# Patient Record
Sex: MALE | Race: WHITE
[De-identification: names, ages, dates, MRNs, and addresses within clinical notes are randomized per-mention and may not be internally consistent; named-entity substitution may affect disease eponyms.]

---

## 2018-06-26 ENCOUNTER — HOSPITAL ENCOUNTER (EMERGENCY)
Dept: HOSPITAL 58 - ED | Age: 53
Discharge: HOME | End: 2018-06-26

## 2018-06-26 VITALS — BODY MASS INDEX: 29.5 KG/M2

## 2018-06-26 VITALS — SYSTOLIC BLOOD PRESSURE: 154 MMHG | TEMPERATURE: 98.3 F | DIASTOLIC BLOOD PRESSURE: 117 MMHG

## 2018-06-26 DIAGNOSIS — R10.9: Primary | ICD-10-CM

## 2018-06-26 DIAGNOSIS — Z79.899: ICD-10-CM

## 2018-06-26 DIAGNOSIS — R93.2: ICD-10-CM

## 2018-06-26 PROCEDURE — 93005 ELECTROCARDIOGRAM TRACING: CPT

## 2018-06-26 PROCEDURE — 82550 ASSAY OF CK (CPK): CPT

## 2018-06-26 PROCEDURE — 84484 ASSAY OF TROPONIN QUANT: CPT

## 2018-06-26 PROCEDURE — 36415 COLL VENOUS BLD VENIPUNCTURE: CPT

## 2018-06-26 PROCEDURE — 96372 THER/PROPH/DIAG INJ SC/IM: CPT

## 2018-06-26 PROCEDURE — 83690 ASSAY OF LIPASE: CPT

## 2018-06-26 PROCEDURE — 82150 ASSAY OF AMYLASE: CPT

## 2018-06-26 PROCEDURE — 85025 COMPLETE CBC W/AUTO DIFF WBC: CPT

## 2018-06-26 PROCEDURE — 80053 COMPREHEN METABOLIC PANEL: CPT

## 2018-06-26 PROCEDURE — 81001 URINALYSIS AUTO W/SCOPE: CPT

## 2018-06-26 PROCEDURE — 99283 EMERGENCY DEPT VISIT LOW MDM: CPT

## 2018-06-26 PROCEDURE — 93010 ELECTROCARDIOGRAM REPORT: CPT

## 2018-06-26 NOTE — ED.PDOC
General


ED Provider: 


Dr. MATIAS CRUZ-ER





Chief Complaint: Abdominal Pain


Stated Complaint: im hurting


Time Seen by Physician: 19:40


Mode of Arrival: Walk-In


Information Source: Patient, Family


Exam Limitations: No limitations


Nursing and Triage Documentation Reviewed and Agree: Yes


Does patient meet sepsis criteria?: No


System Inflammatory Response Syndrome: Not Applicable


Sepsis Protocol: 


For patient's 13 years and over:





Temp is 96.8 and below  and greater


Pulse >90 BPM


Resp >20/minute


Acutely Altered Mental Status





Are patient's symptoms suggestive of a new infection, such as:


   -Pneumonia


   -Skin, Soft Tissue


   -Endocarditis


   -UTI


   -Bone, Joint Infection


   -Implantable Device


   -Acute Abdominal Infection


   -Wound Infection


   -Meningitis


   -Blood Stream Catheter Infection


   -Unknown








GI Complaint Exam





- Abdominal Pain Complaint/Exam


Onset: Gradual


Duration: several hours


Symptoms Are: Still present


Timing: Intermittent


Initial Severity: Mild


Current Severity: Moderate


Location of Pain: Diffuse


Radiates To: Reports: Back


Character: Reports: Dull, Aching, Cramping


Alleviating: Reports: Spontaneous resolution


Associated Signs and Symptoms: Denies: Diaphoresis, Fever, Cough, Chest pain, 

Dizziness, Back pain, Constipation, Blood in stool, Dysuria, Urinary frequency, 

Decreased urine output, Decreased appetite, Discharge, Nausea, Vomiting, 

Diarrhea, Decreased activity


Quality Indicator For Non-Traumatic Chest Pain/Syncope: EKG Performed





Review of Systems





- Review Of Systems


Constitutional: Reports: No symptoms


Eyes: Reports: No symptoms


Ears, Nose, Mouth, Throat: Reports: No symptoms


Respiratory: Reports: No symptoms


Cardiac: Reports: No symptoms


GI: Reports: Abdomen distended


: Reports: No symptoms


Musculoskeletal: Reports: No symptoms


Skin: Reports: No symptoms


Neurological: Reports: No symptoms


Endocrine: Reports: No symptoms


Hematologic/Lymphatic: Reports: No symptoms


All Other Systems: Reviewed and Negative





Past Medical History





- Past Medical History


Previously Healthy: No


Endocrine: Reports: Unknown


Cardiovascular: Reports: Unknown


Respiratory: Reports: Unknown


Hematological: Reports: Unknown


Gastrointestinal: Reports: Crohn's


Genitourinary: Reports: Unknown


Neuro/Psych: Reports: Unknown


Musculoskeletal: Reports: Unknown


Cancer: Reports: Unknown





- Surgical History


General Surgical History: Reports: Unknown





- Family History


Family History: Reports: Unknown





- Social History


Smoking Status: Never smoker


Hx Substance Use: No


Alcohol Screening: None





Physical Exam





- Physical Exam


Appearance: Well-appearing, No pain distress, Well-nourished


Pain Distress: Mild


Eyes: ELISA, EOMI, Conjunctiva clear


ENT: Ears normal, Nose normal, Oropharynx normal


Neck: Supple


Respiratory: Airway patent, Breath sounds clear, Breath sounds equal, 

Respirations nonlabored


Cardiovascular: RRR, Pulses normal, No rub, No murmur


GI/: Soft, Nontender, No masses, Bowel sounds normal, No Organomegaly


Musculoskeletal: Normal strength


Skin: Warm, Dry, Normal color


Neurological: Sensation intact


Psychiatric: Affect appropriate, Mood appropriate





Interpretation





- Radiology Interpretation


Radiology Interpretation By: Radiologist


Radiology Results: Positive


Exam Interpreted: CT Scan





- EKG Interpretation


Time of EKG #1: 21:10


Rate: Normal


Rhythm: Sinus


Ectopy: None


Axis: NL


ST Segment: Normal





Re-Evaluation





- Re-Evaluation


Time of Re-Evaluation: 21:10


Status: Improved


Vital Signs Stable: Yes


Pain Level: 1


Appearance: NAD


Lungs: Clear


Skin: Warm and Dry


Neuro: Alert and Oriented X3


CV: RRR





Critical Care Note





- Critical Care Note


Total Time (mins): 0





Course





- Course


Hematology/Chemistry: 


 06/26/18 20:05





 06/26/18 20:05


Orders, Labs, Meds: 





Lab Review











  06/26/18 06/26/18 06/26/18





  20:05 20:05 20:24


 


WBC  8.62  


 


RBC  4.72  


 


Hgb  14.4  


 


Hct  41.3 L  


 


MCV  87.5  


 


MCH  30.5  


 


MCHC  34.9  


 


RDW Coeff of Susie  12.5  


 


Plt Count  182  


 


Immature Gran % (Auto)  0.3  


 


Neut % (Auto)  77.2  


 


Lymph % (Auto)  16.8  


 


Mono % (Auto)  4.5  


 


Eos % (Auto)  0.7  


 


Baso % (Auto)  0.5  


 


Immature Gran # (Auto)  0.0  


 


Neut # (Auto)  6.7  


 


Lymph # (Auto)  1.5  


 


Mono # (Auto)  0.4  


 


Eos # (Auto)  0.1  


 


Baso # (Auto)  0.0  


 


Sodium   138 


 


Potassium   3.8 


 


Chloride   106 


 


Carbon Dioxide   23 


 


Anion Gap   12.8 


 


BUN   11 


 


Creatinine   0.84 


 


Estimated GFR (MDRD)   97.00 


 


BUN/Creatinine Ratio   13.09 


 


Glucose   107 H 


 


Calcium   9.1 


 


Total Bilirubin   0.4 


 


AST   23 


 


ALT   27 


 


Alkaline Phosphatase   94 


 


Total Creatine Kinase   108 


 


Troponin I   < 0.0100 


 


Total Protein   7.8 


 


Albumin   3.9 


 


Globulin   3.9 


 


Albumin/Globulin Ratio   1.00 


 


Amylase   49 


 


Lipase   32 


 


Urine Color    Yellow


 


Urine Clarity    Clear


 


Urine pH    5.5


 


Ur Specific Gravity    1.025


 


Urine Protein    Negative


 


Urine Glucose (UA)    Negative


 


Urine Ketones    Negative


 


Urine Blood    Negative


 


Urine Nitrite    Negative


 


Urine Bilirubin    Negative


 


Urine Urobilinogen    0.2


 


Ur Leukocyte Esterase    Negative








Orders











 Category Date Time Status


 


 EKG-(ED ONLY) Stat CARDIO  06/26/18 19:51 Completed


 


 AMYLASE Stat LAB  06/26/18 20:05 Completed


 


 CBC W/ AUTO DIFF Stat LAB  06/26/18 20:05 Completed


 


 COMPREHENSIVE METABOLIC PANEL Stat LAB  06/26/18 20:05 Completed


 


 CREATINE KINASE Stat LAB  06/26/18 20:05 Completed


 


 LIPASE Stat LAB  06/26/18 20:05 Completed


 


 TROPONIN I Stat LAB  06/26/18 20:05 Completed


 


 URINALYSIS C & S IF INDICATED Stat LAB  06/26/18 20:24 Completed


 


 Dicyclomine Inj [Bentyl] MEDS  06/26/18 19:52 Discontinued





 20 mg IM ONCE STA   


 


 CT ABDOMEN/PELVIS WO CONTRAST Stat RADS  06/26/18 19:52 Completed








Medications














Discontinued Medications














Generic Name Dose Route Start Last Admin





  Trade Name Freq  PRN Reason Stop Dose Admin


 


Dicyclomine HCl  20 mg  06/26/18 19:52  06/26/18 20:25





  Bentyl  IM  06/26/18 19:53  20 mg





  ONCE STA   Administration





     





     





     





     











Vital Signs: 





 











  Temp Pulse Resp BP Pulse Ox


 


 06/26/18 19:37  98.3 F  89  18  154/117 H  98














Departure





- Departure


Time of Disposition: 21:11


Disposition: HOME SELF-CARE


Discharge Problem: 


 Abdominal pain, Abnormal gall bladder diagnostic imaging





Instructions:  Abdominal Pain (ED)


Condition: Good


Pt referred to PMD for follow-up: Yes


IPMP verified?: No


Additional Instructions: 


flagyl 250mg tid x 7 days---bentyl 10mg qid prn pain #30--low fat diet--talk to 

your pcp about ordering u/s of the gallbladder


Allergies/Adverse Reactions: 


Allergies





hydromorphone HCl [From Dilaudid] Adverse Reaction (Verified 06/26/18 19:43)


 


morphine Adverse Reaction (Verified 06/26/18 19:43)


 








Home Medications: 


Ambulatory Orders





Acetaminophen [Acetaminophen Extra Strength] 1,000 mg PO TID 04/21/14 


Docusate Sodium [Colace] 4 tab PO TID 04/21/14 


Gabapentin 600 mg PO 1-3XD 04/21/14 


Hydrocortisone [Proctocream-Hc] 30 gm RC TID 04/21/14 


Lidocaine/Hydrocortisone AC [Lidocaine-Hc 3-0.5% Cream Kit] 1 each RC 2-3XD 04/ 21/14 


Mesalamine [Pentasa] 4 tab PO BID 04/21/14 








Disposition Discussed With: Patient, Family

## 2018-06-26 NOTE — CT
EXAM:  CT abdomen and pelvis without contrast. 

  

HISTORY: Abdominal pain. 

  

TECHNIQUE:  Multi-slice transaxial helical CT. Coronal and sagittal reformatons were performed. 

  

COMPARISON: None 

  

FINDINGS: 

  

The heart is normal in size. Small nodular opacity in the medial right lung base measures up to 1.3 c
m in size. 

  

Evaluation of the solid organs is limited without IV contrast.  The spleen is normal in size.  The ga
llbladder appears contracted.  Subtle hyperdensity is seen within the gallbladder neck region.  No in
trahepatic biliary ductal dilation is seen.  No hydronephrosis or renal calculus is seen.  The gallbl
adder and the bilateral adrenal glands appear grossly unremarkable. 

  

The cecum appears dilated measuring up to 12.8 cm in diameter and contains an air-fluid level.  A mil
d thickening of the rectum is seen which measures up to 1.4 cm in thickness. Gaseous distension of th
e transverse colon is seen which measures up to 5.0 cm in transverse diameter.  No focal narrowing is
 seen within the transverse colon or cecum.  The terminal ileum appears to have been resected and ramy
ears blind ending.  Reanastomosis of small bowel to the cecum is suggested. No pelvic free fluid is s
een.  No retroperitoneal adenopathy is seen.  No definite small bowel thickening is seen.  Diastases 
recti is present.  Osseous structures appear unremarkable. 

  

IMPRESSION: 

  

1.  Distended cecum with prominent air-fluid level.  This could be postoperative versus related to il
eus.  No distinct transition point. 

2.  Operative changes of the cecum/terminal ileum as detailed above. 

3.  No definite small bowel thickening. 

4.  Mild thickening of the rectum which could be related to nondistension versus proctitis. 

5.  1.3 cm nodular opacity in the medial right lung base.  Recommend follow-up CT chest in 3 months t
o confirm persistence. 

4.  Contracted gallbladder with subtle hyperdensity in the gallbladder neck which may represent a tin
y gallstone.